# Patient Record
Sex: MALE
[De-identification: names, ages, dates, MRNs, and addresses within clinical notes are randomized per-mention and may not be internally consistent; named-entity substitution may affect disease eponyms.]

---

## 2017-04-20 ENCOUNTER — APPOINTMENT (OUTPATIENT)
Dept: ORTHOPEDIC SURGERY | Facility: CLINIC | Age: 82
End: 2017-04-20

## 2017-04-20 PROBLEM — Z00.00 ENCOUNTER FOR PREVENTIVE HEALTH EXAMINATION: Status: ACTIVE | Noted: 2017-04-20

## 2017-09-05 ENCOUNTER — EMERGENCY (EMERGENCY)
Facility: HOSPITAL | Age: 82
LOS: 1 days | Discharge: HOME | End: 2017-09-05
Attending: EMERGENCY MEDICINE | Admitting: EMERGENCY MEDICINE
Payer: MEDICARE

## 2017-09-05 VITALS
SYSTOLIC BLOOD PRESSURE: 149 MMHG | TEMPERATURE: 97 F | HEIGHT: 66 IN | RESPIRATION RATE: 18 BRPM | OXYGEN SATURATION: 99 % | DIASTOLIC BLOOD PRESSURE: 73 MMHG | WEIGHT: 139.99 LBS | HEART RATE: 78 BPM

## 2017-09-05 VITALS
OXYGEN SATURATION: 96 % | RESPIRATION RATE: 20 BRPM | SYSTOLIC BLOOD PRESSURE: 121 MMHG | DIASTOLIC BLOOD PRESSURE: 67 MMHG | HEART RATE: 68 BPM

## 2017-09-05 DIAGNOSIS — J90 PLEURAL EFFUSION, NOT ELSEWHERE CLASSIFIED: ICD-10-CM

## 2017-09-05 DIAGNOSIS — R06.02 SHORTNESS OF BREATH: ICD-10-CM

## 2017-09-05 DIAGNOSIS — Z79.899 OTHER LONG TERM (CURRENT) DRUG THERAPY: ICD-10-CM

## 2017-09-05 PROCEDURE — 93005 ELECTROCARDIOGRAM TRACING: CPT

## 2017-09-05 PROCEDURE — 99291 CRITICAL CARE FIRST HOUR: CPT | Mod: 25

## 2017-09-05 PROCEDURE — 80053 COMPREHEN METABOLIC PANEL: CPT

## 2017-09-05 PROCEDURE — 84484 ASSAY OF TROPONIN QUANT: CPT

## 2017-09-05 PROCEDURE — 71250 CT THORAX DX C-: CPT

## 2017-09-05 PROCEDURE — 85610 PROTHROMBIN TIME: CPT

## 2017-09-05 PROCEDURE — 71010: CPT | Mod: 26

## 2017-09-05 PROCEDURE — 71250 CT THORAX DX C-: CPT | Mod: 26,GV

## 2017-09-05 PROCEDURE — 71045 X-RAY EXAM CHEST 1 VIEW: CPT

## 2017-09-05 PROCEDURE — 93010 ELECTROCARDIOGRAM REPORT: CPT

## 2017-09-05 PROCEDURE — 82553 CREATINE MB FRACTION: CPT

## 2017-09-05 PROCEDURE — 82550 ASSAY OF CK (CPK): CPT

## 2017-09-05 PROCEDURE — 85025 COMPLETE CBC W/AUTO DIFF WBC: CPT

## 2017-09-05 PROCEDURE — 36415 COLL VENOUS BLD VENIPUNCTURE: CPT

## 2017-09-05 PROCEDURE — 85730 THROMBOPLASTIN TIME PARTIAL: CPT

## 2017-09-05 PROCEDURE — 99284 EMERGENCY DEPT VISIT MOD MDM: CPT | Mod: 25

## 2017-09-05 NOTE — ED PROVIDER NOTE - MEDICAL DECISION MAKING DETAILS
pt with hx of metastatic prostate ca presenting with shortness of breath s/p b/l pleurx tubes - now with minimal drainage, pe - chronically sick appearing with O2 sat stable on 2 L, ct shows loculated effusions bilaterally.  seen by Dr. Zavala who feels pt should be seen again at [facility] for further intervention.  Does not want to manipulate tubes at this time.  Family understands and agrees. Received radiology reports.  Ambulance transport arranged.

## 2017-09-05 NOTE — ED ADULT NURSE NOTE - OBJECTIVE STATEMENT
Pt BIBA with family under direction of MD Zavala to R/o obstruction of chest tubes. Pt has bilateral chest tubes for prostate CA that metastasized to lungs.  Pt currently 100% on 15lpm O2 by EMS.  Pt chest sounds diminished bilaterally, belly breathing noted. Pt's family also reports "bad ulcer" on sacral area.  Pt is alert and oriented x3.  Pt denies pain, and all other medical complaints at this time. Pt BIBA with family under direction of MD Zavala to R/o obstruction of chest tubes. Pt has bilateral chest tubes for prostate CA that metastasized to lungs. Pt's family reports new onset of SOB today.  Pt currently 100% on 15lpm O2 by EMS.  Pt chest sounds diminished bilaterally, belly breathing noted. Pt's family also reports "bad ulcer" on sacral area.  Pt is alert and oriented x3.  Pt denies pain, and all other medical complaints at this time.  Pt's family reports "little drainage" from chest tubes at home with visiting RN.

## 2017-09-05 NOTE — ED PROVIDER NOTE - OBJECTIVE STATEMENT
89 y/o m with pmh of metastatic prostate ca to bone and lungs presents with worsening shortness of breath.  Pt presents with b/l pleurx drains placed by [facility] several weeks prior.  Daughters state since he's been home he's only been producing minimal drainage from tubes.  Today they noticed O2 to decrease to 80s on normal 2L O2.  No fever or chills.  Stable cough.

## 2017-09-05 NOTE — ED PROVIDER NOTE - PROGRESS NOTE DETAILS
extensive discussion b/w Lucas and pt's family - decided better to do no further intervention at this time as it might become more complicated and dangerous (in terms of moving tubes and manipulation with blood thinner).

## 2017-09-05 NOTE — ED PROVIDER NOTE - CRITICAL CARE PROVIDED
documentation/consult w/ pt's family directly relating to pts condition/direct patient care (not related to procedure)/additional history taking/interpretation of diagnostic studies/consultation with other physicians/conducted a detailed discussion of DNR status/telephone consultation with the patient's family

## 2017-09-21 RX ORDER — METOPROLOL TARTRATE 50 MG
0 TABLET ORAL
Qty: 0 | Refills: 0 | COMMUNITY